# Patient Record
Sex: MALE | Race: AMERICAN INDIAN OR ALASKA NATIVE | URBAN - METROPOLITAN AREA
[De-identification: names, ages, dates, MRNs, and addresses within clinical notes are randomized per-mention and may not be internally consistent; named-entity substitution may affect disease eponyms.]

---

## 2019-07-09 ENCOUNTER — OFFICE VISIT (OUTPATIENT)
Dept: URBAN - METROPOLITAN AREA CLINIC 17 | Facility: CLINIC | Age: 44
End: 2019-07-09
Payer: COMMERCIAL

## 2019-07-09 DIAGNOSIS — E11.3513 TYPE 2 DIABETES MELLITUS W/ PROLIFERATIVE DIABETIC RETINOPATHY W/ MACULAR EDEMA, BILATERAL: Primary | ICD-10-CM

## 2019-07-09 PROCEDURE — 92134 CPTRZ OPH DX IMG PST SGM RTA: CPT | Performed by: OPHTHALMOLOGY

## 2019-07-09 PROCEDURE — 92004 COMPRE OPH EXAM NEW PT 1/>: CPT | Performed by: OPHTHALMOLOGY

## 2019-07-09 RX ORDER — OFLOXACIN 3 MG/ML
0.3 % SOLUTION/ DROPS OPHTHALMIC
Qty: 1 | Refills: 0 | Status: INACTIVE
Start: 2019-07-09 | End: 2020-02-12

## 2019-07-09 RX ORDER — PREDNISOLONE ACETATE 10 MG/ML
1 % SUSPENSION/ DROPS OPHTHALMIC
Qty: 1 | Refills: 1 | Status: INACTIVE
Start: 2019-07-09 | End: 2020-02-12

## 2019-07-09 ASSESSMENT — INTRAOCULAR PRESSURE
OS: 10
OD: 10

## 2019-07-09 NOTE — IMPRESSION/PLAN
Impression: Type 2 diabetes mellitus w/ proliferative diabetic retinopathy w/ macular edema, bilateral: G28.1448. Plan: Exam today w/VH, NV OU. OD: D/w pt options for obs vs surgical intervention. Pt motivated for surgery. OS: Hx of PRP OS. Rec treatment w/Avastin inj OU today. R/B/A d/w pt. Consent obtained. Administered Avastin inj OU today. Also Discussed R/B/A's of surgery OD including bleeding, infection, retinal tear/detachment, increased or decreased IOP, cataract, failure to accomplish surgical goals, need for repeat surgery, loss of vision, loss of eye, anesthetic complications (to be further reviewed with anesthesiologist on day of surgery), death. RL2. Plan for PPV, EL, OD. Plan for OS pending OD. (Oflox/PF QID -start day after sx).

## 2020-02-12 ENCOUNTER — OFFICE VISIT (OUTPATIENT)
Dept: URBAN - METROPOLITAN AREA CLINIC 33 | Facility: CLINIC | Age: 45
End: 2020-02-12
Payer: COMMERCIAL

## 2020-02-12 DIAGNOSIS — H25.13 AGE-RELATED NUCLEAR CATARACT, BILATERAL: ICD-10-CM

## 2020-02-12 DIAGNOSIS — H43.311 VITREOUS MEMBRANES AND STRANDS, RIGHT EYE: ICD-10-CM

## 2020-02-12 DIAGNOSIS — H43.13 VITREOUS HEMORRHAGE, BILATERAL: ICD-10-CM

## 2020-02-12 DIAGNOSIS — E11.3593 TYPE 2 DIABETES MELLITUS W/ PROLIFERATIVE DIABETIC RETINOPATHY W/O MACULAR EDEMA, BILATERAL: Primary | ICD-10-CM

## 2020-02-12 PROCEDURE — 92014 COMPRE OPH EXAM EST PT 1/>: CPT | Performed by: OPHTHALMOLOGY

## 2020-02-12 PROCEDURE — 92134 CPTRZ OPH DX IMG PST SGM RTA: CPT | Performed by: OPHTHALMOLOGY

## 2020-02-12 ASSESSMENT — INTRAOCULAR PRESSURE
OS: 10
OD: 10

## 2020-02-12 NOTE — IMPRESSION/PLAN
Impression: Age-related nuclear cataract, bilateral: H25.13. OU. Condition: stable. Vision: vision not affected. Plan: Discussed diagnosis in detail with patient. No treatment is required at this time. Will continue to observe condition and or symptoms.

## 2020-02-12 NOTE — IMPRESSION/PLAN
Impression: Type 2 diabetes mellitus w/ proliferative diabetic retinopathy w/o macular edema, bilateral: C35.1518. OU. Condition: unstable OD >> OS. Vision: vision affected. s/p AV OU 07/09/2019 w/Dr Friend. Plan: Discussed diagnosis in detail with patient. Discussed risks of progression. Discussed treatment options with patient: BI tx, laser tx and/or surgery. Surgical treatment is recommended for the right eye in order to remove the blood for possible vision improvement PPVx RIGHT EYE. Surgical risks and benefits were discussed, explained and understood by patient. All questions answered. RL1. Educational material provided to patient. OCT shows a hazy view OD >> OS. Erx Prednisolone and Ofloxacin to patient's pharmacy. Schedule surgery OD next available. In addition, recommend Intravitreal Injection treatment in both eyes today to help reduce the blood and prevent a further reduction in vision. Discussed the risks and benefits of tx. All questions answered. Patient elects to proceed with recommendation. Patient understands that additional BI treatments or laser treatments may be needed in the future.

## 2020-02-12 NOTE — IMPRESSION/PLAN
Impression: Vitreous hemorrhage, bilateral: H43.13. OU. Condition: unstable OD > OS. Vision: vision affected. Plan: Discussed diagnosis in detail with patient. Discussed risks of progression. Recommend BI tx OU and surgery OD - see notes above.

## 2020-02-12 NOTE — IMPRESSION/PLAN
Impression: Vitreous membranes and strands, right eye: H43.311. OD. Condition: unstable. Vision: vision affected. Plan: Discussed diagnosis in detail with patient. Discussed risks of progression. Recommend BI tx OD and surgery OD - see notes above.

## 2020-02-26 ENCOUNTER — SURGERY (OUTPATIENT)
Dept: URBAN - METROPOLITAN AREA SURGERY 15 | Facility: SURGERY | Age: 45
End: 2020-02-26
Payer: COMMERCIAL

## 2020-02-26 PROCEDURE — 67041 VIT FOR MACULAR PUCKER: CPT | Performed by: OPHTHALMOLOGY

## 2020-02-27 ENCOUNTER — POST-OPERATIVE VISIT (OUTPATIENT)
Dept: URBAN - METROPOLITAN AREA CLINIC 33 | Facility: CLINIC | Age: 45
End: 2020-02-27

## 2020-02-27 DIAGNOSIS — Z09 ENCNTR FOR F/U EXAM AFT TRTMT FOR COND OTH THAN MALIG NEOPLM: Primary | ICD-10-CM

## 2020-02-27 PROCEDURE — 99024 POSTOP FOLLOW-UP VISIT: CPT | Performed by: OPTOMETRIST

## 2020-02-27 RX ORDER — PREDNISOLONE ACETATE 10 MG/ML
1 % SUSPENSION/ DROPS OPHTHALMIC
Qty: 10 | Refills: 2 | Status: ACTIVE
Start: 2020-02-27

## 2020-02-27 RX ORDER — OFLOXACIN 3 MG/ML
0.3 % SOLUTION/ DROPS OPHTHALMIC
Qty: 1 | Refills: 0 | Status: ACTIVE
Start: 2020-02-27

## 2020-02-27 ASSESSMENT — INTRAOCULAR PRESSURE
OS: 10
OD: 12